# Patient Record
Sex: FEMALE | Race: WHITE | URBAN - METROPOLITAN AREA
[De-identification: names, ages, dates, MRNs, and addresses within clinical notes are randomized per-mention and may not be internally consistent; named-entity substitution may affect disease eponyms.]

---

## 2017-08-10 ENCOUNTER — TELEPHONE (OUTPATIENT)
Dept: OBGYN CLINIC | Age: 27
End: 2017-08-10

## 2017-08-10 NOTE — TELEPHONE ENCOUNTER
Patient called to check status of OCP refill. Request was rcvd and awaiting reply. Advised her to schedule appt since last ov is over two years but patient declined.